# Patient Record
Sex: MALE | URBAN - METROPOLITAN AREA
[De-identification: names, ages, dates, MRNs, and addresses within clinical notes are randomized per-mention and may not be internally consistent; named-entity substitution may affect disease eponyms.]

---

## 2021-07-26 ENCOUNTER — APPOINTMENT (RX ONLY)
Dept: URBAN - METROPOLITAN AREA CLINIC 41 | Facility: CLINIC | Age: 51
Setting detail: DERMATOLOGY
End: 2021-07-26

## 2021-07-26 DIAGNOSIS — R21 RASH AND OTHER NONSPECIFIC SKIN ERUPTION: ICD-10-CM | Status: INADEQUATELY CONTROLLED

## 2021-07-26 DIAGNOSIS — L29.8 OTHER PRURITUS: ICD-10-CM | Status: INADEQUATELY CONTROLLED

## 2021-07-26 DIAGNOSIS — L29.89 OTHER PRURITUS: ICD-10-CM | Status: INADEQUATELY CONTROLLED

## 2021-07-26 PROCEDURE — ? PRESCRIPTION

## 2021-07-26 PROCEDURE — 11104 PUNCH BX SKIN SINGLE LESION: CPT

## 2021-07-26 PROCEDURE — 99203 OFFICE O/P NEW LOW 30 MIN: CPT | Mod: 25

## 2021-07-26 PROCEDURE — ? ADDITIONAL NOTES

## 2021-07-26 PROCEDURE — ? PRESCRIPTION MEDICATION MANAGEMENT

## 2021-07-26 PROCEDURE — ? COUNSELING

## 2021-07-26 PROCEDURE — ? BIOPSY BY PUNCH METHOD

## 2021-07-26 RX ORDER — HYDROXYZINE HYDROCHLORIDE 10 MG/1
TABLET, FILM COATED ORAL QHS
Qty: 120 | Refills: 1 | Status: ERX | COMMUNITY
Start: 2021-07-26

## 2021-07-26 RX ADMIN — HYDROXYZINE HYDROCHLORIDE: 10 TABLET, FILM COATED ORAL at 00:00

## 2021-07-26 ASSESSMENT — LOCATION ZONE DERM: LOCATION ZONE: ARM

## 2021-07-26 ASSESSMENT — LOCATION DETAILED DESCRIPTION DERM: LOCATION DETAILED: RIGHT ANTERIOR SHOULDER

## 2021-07-26 ASSESSMENT — LOCATION SIMPLE DESCRIPTION DERM: LOCATION SIMPLE: RIGHT SHOULDER

## 2021-07-26 NOTE — PROCEDURE: COUNSELING
Patient Specific Counseling (Will Not Stick From Patient To Patient): Yandy Anderson MD notes the most focal area is the inner thigh. Active areas last about 36 hours. Yandy Anderson MD notes redness is tranforming into purple lesions. \\n\\nFjus Anderson MD counsels patient this is diagnostically uncertain. Skin biopsy would be useful to get a more definitive diagnosis. Biopsy would be run STAT. Yandy Anderson MD notes he does not believe this is life threatening but it is difficult to get that snapchat and have rash exacerbate in the meantime. Biopsy would r/o dinorah flex syndrome. Yandy Anderson MD notes a rash that is morphing indicates the skin has not been permanently damanged and might not be evident in the skin tissue sent for biopsy. Yandy Anderson MD would like to take advantage of patient being in the office today for biopsy.\\n\\nFjus Anderson MD notes he is favoring urticarial allergic reaction rather than a contact dermatitis due to mobile nature. Allergic reactions are immediate and vary in presentation. Yandy Anderson MD notes reactions are a little more difficult to resolve. Patient counseled to present to ER if he develops swelling of the throat, tongue or lips. Patient counseled that he might need to consult with allergist once this resolves to help determine cause. \\n\\Melanie terms of treatment options, Yandy Anderson MD notes steroids might not lead to much improvement. Antibiotics are not necessary. Yandy Anderson MD notes antihistamines will probably lead to the most improvement. Yandy Anderson MD counsels patient this may take 2-3 months to resolve. Patient currently taking atarax once every 6 hours and is leading to some relief. Yandy Anderson MD notes zyrtec and benadryl are the best OTC options to use for this. Atarax will lead to more relief but will lead to increased drowsiness. Up to 50 mg of zyrtec in the morning. Yandy Anderson MD notes the goal is to lead to comfort and the rash will pass with time. Yandy Anderson MD believes the rash is centrally and is traveling peripherally.\\n\\nPatient states rash peaked in severity yesterday. Yandy Anderson MD reviews photos and believes rash is improving. Yandy Anderson MD notes there is a chance patient returns home and is exposed to the causative agent. Patient inquires about the dog being a cause. Yandy Anderson MD notes this is likely not the cause. Yandy Anderson MD does not believe bed bugs could have tranferred a disease, they do not carry diseases.
Antihistamine Recommendations: Zyrtecnatl
Detail Level: Detailed
Patient Specific Counseling (Will Not Stick From Patient To Patient): Patient counseled to avoid operating machinery with antihistamines.

## 2021-07-26 NOTE — PROCEDURE: PRESCRIPTION MEDICATION MANAGEMENT
Render In Strict Bullet Format?: No
Detail Level: Zone
Continue Regimen: Triamcinolone 0.1% ointment twice daily \\nPrednisone course

## 2021-07-26 NOTE — PROCEDURE: BIOPSY BY PUNCH METHOD
Detail Level: Detailed
Was A Bandage Applied: Yes
Punch Size In Mm: 4
Biopsy Type: H and E
Anesthesia Type: 1% lidocaine with epinephrine
Anesthesia Volume In Cc (Will Not Render If 0): 0.5
Additional Anesthesia Volume In Cc (Will Not Render If 0): 0
Hemostasis: None
Epidermal Sutures: 4-0 Prolene
Wound Care: Petrolatum
Dressing: bandage
Suture Removal: 7 days
Patient Will Remove Sutures At Home?: No
Consent: Written consent was obtained and risks were reviewed including but not limited to scarring, infection, bleeding, scabbing, incomplete removal, nerve damage and allergy to anesthesia.
Post-Care Instructions: I reviewed with the patient in detail post-care instructions. Patient is to keep the biopsy site dry overnight, and then apply bacitracin twice daily until healed. Patient may apply hydrogen peroxide soaks to remove any crusting.
Home Suture Removal Text: Patient was provided a home suture removal kit and will remove their sutures at home.  If they have any questions or difficulties they will call the office.
Notification Instructions: Patient will be notified of biopsy results. However, patient instructed to call the office if not contacted within 2 weeks.
Anticipated Plan (Based On Presumed Biopsy Results): Return to office to discuss
Billing Type: Third-Party Bill
Information: Selecting Yes will display possible errors in your note based on the variables you have selected. This validation is only offered as a suggestion for you. PLEASE NOTE THAT THE VALIDATION TEXT WILL BE REMOVED WHEN YOU FINALIZE YOUR NOTE. IF YOU WANT TO FAX A PRELIMINARY NOTE YOU WILL NEED TO TOGGLE THIS TO 'NO' IF YOU DO NOT WANT IT IN YOUR FAXED NOTE.

## 2021-07-26 NOTE — HPI: RASH
Is This A New Presentation, Or A Follow-Up?: Rash
Additional History: A week ago patient has fevr for 36 hours and then the fever resolved. Patient notes guests in the house brought bed bugs and he began to develop rash throughout body. Three days ago the spotches began to worsen, they were not red but ridges. Patient has continued to move. [facility] and was given prednisone, atarax and doxycyline. Oatient went to er last night and was taken off of doxycycline and was kept on atarax and prednisone dosage changed to a 7 day course of a 60, 40, 20 taper. Blood cultures taken in ER. Yandy Anderson MD consulted in ER and told patient to come in.

## 2021-08-03 ENCOUNTER — APPOINTMENT (RX ONLY)
Dept: URBAN - METROPOLITAN AREA CLINIC 41 | Facility: CLINIC | Age: 51
Setting detail: DERMATOLOGY
End: 2021-08-03

## 2021-08-03 DIAGNOSIS — Z48.02 ENCOUNTER FOR REMOVAL OF SUTURES: ICD-10-CM

## 2021-08-03 PROCEDURE — ? SUTURE REMOVAL (GLOBAL PERIOD)

## 2021-08-03 PROCEDURE — ? ADDITIONAL NOTES

## 2021-08-03 ASSESSMENT — LOCATION SIMPLE DESCRIPTION DERM: LOCATION SIMPLE: RIGHT SHOULDER

## 2021-08-03 ASSESSMENT — LOCATION DETAILED DESCRIPTION DERM: LOCATION DETAILED: RIGHT ANTERIOR SHOULDER

## 2021-08-03 ASSESSMENT — LOCATION ZONE DERM: LOCATION ZONE: ARM

## 2021-08-03 NOTE — PROCEDURE: SUTURE REMOVAL (GLOBAL PERIOD)
Detail Level: Detailed
Add 05951 Cpt? (Important Note: In 2017 The Use Of 46275 Is Being Tracked By Cms To Determine Future Global Period Reimbursement For Global Periods): no

## 2022-04-06 ENCOUNTER — APPOINTMENT (RX ONLY)
Dept: URBAN - METROPOLITAN AREA CLINIC 41 | Facility: CLINIC | Age: 52
Setting detail: DERMATOLOGY
End: 2022-04-06

## 2022-04-06 DIAGNOSIS — L30.9 DERMATITIS, UNSPECIFIED: ICD-10-CM | Status: INADEQUATELY CONTROLLED

## 2022-04-06 PROCEDURE — ? PRESCRIPTION

## 2022-04-06 PROCEDURE — 99213 OFFICE O/P EST LOW 20 MIN: CPT

## 2022-04-06 PROCEDURE — ? ADDITIONAL NOTES

## 2022-04-06 PROCEDURE — ? PRESCRIPTION MEDICATION MANAGEMENT

## 2022-04-06 PROCEDURE — ? COUNSELING

## 2022-04-06 PROCEDURE — ? TREATMENT REGIMEN

## 2022-04-06 RX ORDER — PREDNISONE 20 MG/1
TABLET ORAL
Qty: 42 | Refills: 0 | Status: ERX | COMMUNITY
Start: 2022-04-06

## 2022-04-06 RX ORDER — HYDROXYZINE HYDROCHLORIDE 25 MG/1
TABLET, FILM COATED ORAL
Qty: 30 | Refills: 0 | Status: ERX | COMMUNITY
Start: 2022-04-06

## 2022-04-06 RX ADMIN — PREDNISONE: 20 TABLET ORAL at 00:00

## 2022-04-06 RX ADMIN — HYDROXYZINE HYDROCHLORIDE: 25 TABLET, FILM COATED ORAL at 00:00

## 2022-04-06 NOTE — PROCEDURE: PRESCRIPTION MEDICATION MANAGEMENT
Initiate Treatment: Hydroxyzine 25mg QHS\\nPrednisone 20mg tabs (60mg for 1 week, then 40mg for 1 week, then 20mg for one week)
Render In Strict Bullet Format?: No
Detail Level: Zone

## 2022-04-06 NOTE — PROCEDURE: COUNSELING
Patient Specific Counseling (Will Not Stick From Patient To Patient): —\\nPatient declined triamcinolone. Notes this wasn’t helpful at the last appointment.
Detail Level: Detailed